# Patient Record
Sex: MALE | Race: WHITE
[De-identification: names, ages, dates, MRNs, and addresses within clinical notes are randomized per-mention and may not be internally consistent; named-entity substitution may affect disease eponyms.]

---

## 2017-04-05 ENCOUNTER — HOSPITAL ENCOUNTER (EMERGENCY)
Dept: HOSPITAL 74 - JERFT | Age: 32
Discharge: HOME | End: 2017-04-05
Payer: COMMERCIAL

## 2017-04-05 VITALS — DIASTOLIC BLOOD PRESSURE: 54 MMHG | TEMPERATURE: 98.3 F | HEART RATE: 64 BPM | SYSTOLIC BLOOD PRESSURE: 106 MMHG

## 2017-04-05 VITALS — BODY MASS INDEX: 27.4 KG/M2
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
## 2017-04-05 DIAGNOSIS — Z87.891: ICD-10-CM
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
## 2017-04-05 DIAGNOSIS — S93.401A: Primary | ICD-10-CM

## 2017-04-05 PROCEDURE — 2W3SX1Z IMMOBILIZATION OF RIGHT FOOT USING SPLINT: ICD-10-PCS

## 2017-04-05 NOTE — PDOC
History of Present Illness





- General


Chief Complaint: Pain


Stated Complaint: ANKLE PAIN


Time Seen by Provider: 04/05/17 11:28


History Source: Patient


Exam Limitations: No Limitations





- History of Present Illness


Initial Comments: 


04/05/17 12:36











Chief complaint: Twisted right ankle yesterday








History of present illness: Patient is a 32-year-old male with no significant 

medical problems here today complaining of pain to his right bilateral ankle 

with bruising of right posterior lateral foot and ankle area after he stepped 

on a box yesterday twisting his right ankle. Patient reports that pain 

currently as a 6 or 7 after taking Aleve earlier and an 8 with applying 

pressure to his right foot. Patient denies any numbness of right foot or ankle.





04/06/17 16:52








04/06/17 16:52








04/06/17 16:52





Occurred: reports: yesterday


Severity: Yes: moderate


Lower Extremity Pain Location: right: ankle


Method of Injury: Yes: twisted


Modifying Factors: improves with: immobilization





Lower Ext. Injury Location





- Specific Injury Location


Ankle: right ecchymosis, right pain, right swelling


Foot: right foot ecchymosis (lateral ), right foot pain





Extremity Pain Location





- Extremity Pain Location


Extremity Pain Locations: right: foot, ankle





Past History





- Past Medical History


Allergies/Adverse Reactions: 


 Allergies











Allergy/AdvReac Type Severity Reaction Status Date / Time


 


No Known Allergies Allergy   Verified 04/05/17 10:24











Home Medications: 


Ambulatory Orders





Ibuprofen 600 mg PO Q6H PRN #18 tablet 04/05/17 








Anemia: No


Asthma: No


Cancer: No


Cardiac Disorders: No


CVA: No


COPD: No


CHF: No


Dementia: No


Diabetes: No


GI Disorders: No


 Disorders: No


HTN: No


Hypercholesterolemia: No


Liver Disease: No


Suicide Attempt (Hx): No


Seizures: No


Thyroid Disease: No





- Surgical History


Abdominal Surgery: No


Appendectomy: No


Cardiac Surgery: No


Cholecystectomy: No


Lung Surgery: No


Neurologic Surgery: No


Orthopedic Surgery: No





- Psycho/Social/Smoking Cessation Hx


Suicidal Ideation: No


Smoking History: Never smoked


Have you smoked in the past 12 months: Yes


Number of Cigarettes Smoked Daily: 3


'Breaking Loose' booklet given: 08/19/15


Hx Alcohol Use: Yes (SOCIAL)


Drug/Substance Use Hx: No


Substance Use Type: None


Hx Substance Use Treatment: No





**Review of Systems





- Review of Systems


Able to Perform ROS?: Yes


Constitutional: No: Symptoms Reported


HEENTM: No: Symptoms Reported


Respiratory: No: Symptoms reported


Cardiac (ROS): No: Symptoms Reported


ABD/GI: No: Symptoms Reported


: No: Symptoms Reported


Musculoskeletal: Yes: Joint Pain (rt. ankle b/l), Joint Swelling (b/l ankle rt. 

)


Integumentary: Yes: Bruising (rt. lateral foot )


Neurological: No: Symptoms reported





*Physical Exam





- Vital Signs


 Last Vital Signs











Temp Pulse Resp BP Pulse Ox


 


 98.3 F   64   20   106/54   97 


 


 04/05/17 10:21  04/05/17 10:21  04/05/17 10:21  04/05/17 10:21  04/05/17 10:21














- Physical Exam


General Appearance: Yes: Appropriately Dressed


Vascular Pulses: Dorsalis-Pedis (R): 4+


Extremity: positive: Normal Capillary Refill, Normal Range of Motion (rt.ankle)

, Tender (rt. b/l ankle ), Swelling (rt. ankle b/l )


Integumentary: positive: Bruising (rt. foot bruising lateral )


Neurologic: positive: Normal Response, Respond to painful stimul (rt. foot/

ankle ), Responsive


Deep Tendon Reflexes: Ankle (L): 4+ (no induration )





Procedures





- Consent


Consent obtained: From Patient





- Splinting


Splint Location: Right: Foot, Ankle


Pre-Proc Neuro Vasc Exam: normal


Pre-Made Type: aircast


Ace Bandage: 3"


Complications: No





ED Treatment Course





- RADIOLOGY


Radiology Studies Ordered: 














 Category Date Time Status


 


 ANKLE & FOOT-RIGHT* [RAD] Stat Radiology  04/05/17 11:28 Completed














Medical Decision Making





- Medical Decision Making


04/05/17 12:37


 Patient is a 32-year-old male with no significant medical problems here today 

complaining of pain to his right bilateral ankle with bruising of right 

posterior lateral foot and ankle area after he stepped on a box yesterday 

twisting his right ankle. Patient reports that pain currently as a 6 or 7 after 

taking Aleve earlier and an 8 with applying pressure to his right foot. Patient 

denies any numbness of right foot or ankle.

















Right foot ankle pain rule out fracture versus sprain














Plan:








X-ray right foot ankle bimalleolar edema noted no fracture noted per Dr. Javier Norman wrap to right ankle and Aircast and crutches


Follow-up with orthopedist as soon as possible





Patient instructed to elevate his right foot as much as possible and apply ice 

for 15 minutes every hour





Ibuprofen 600 mg every 6 hours as needed for pain








04/05/17 12:38








04/06/17 16:55








*DC/Admit/Observation/Transfer


Diagnosis at time of Disposition: 


Right ankle sprain


Qualifiers:


 Encounter type: initial encounter Involved ligament of ankle: unspecified 

ligament Qualified Code(s): S93.401A - Sprain of unspecified ligament of right 

ankle, initial encounter





- Discharge Dispostion


Disposition: HOME


Condition at time of disposition: Stable





- Prescriptions


Prescriptions: 


Ibuprofen 600 mg PO Q6H PRN #18 tablet


 PRN Reason: Pain





- Referrals


Referrals: 


Liat Garg MD [Primary Care Provider] - 


Edward Pendleton MD [Staff Physician] - 





- Patient Instructions


Additional Instructions: 











Foot as much as possible and apply ice for 15 minutes every hour while awake 

today and tomorrow








Keep Ace wrap on an air cast during the day and use crutches for ambulation may 

take care cast and Ace wrap off at night when sleeping











Follow-up with orthopedist today or tomorrow











Return to emergency room if symptoms worsen














Patient voiced understanding of discharge instructions and all questions were 

answered





- Post Discharge Activity


Work/School Note:  Back to Work

## 2018-06-25 ENCOUNTER — HOSPITAL ENCOUNTER (EMERGENCY)
Dept: HOSPITAL 74 - JERFT | Age: 33
Discharge: HOME | End: 2018-06-25
Payer: COMMERCIAL

## 2018-06-25 VITALS — DIASTOLIC BLOOD PRESSURE: 60 MMHG | HEART RATE: 68 BPM | SYSTOLIC BLOOD PRESSURE: 113 MMHG | TEMPERATURE: 98 F

## 2018-06-25 VITALS — BODY MASS INDEX: 24.7 KG/M2

## 2018-06-25 DIAGNOSIS — S39.012A: Primary | ICD-10-CM
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
## 2018-06-25 NOTE — PDOC
History of Present Illness





- General


Chief Complaint: Back Pain


Stated Complaint: BACK PAIN


Time Seen by Provider: 06/25/18 09:24


History Source: Patient


Exam Limitations: No Limitations





- History of Present Illness


Initial Comments: 





06/25/18 09:36


Patient states yesterday was sitting into his car, fell backwards heavily and 

banged the back of his back on the car seat. Since that time as had pain to his 

inferior left rib border and back musculature. No medication for relief. Denies 

numbness or tingling to hands or feet, denies any neck or spine pain. States 

pain is primarily to the left flank lower back area


Occurred: reports: yesterday


Severity: reports: mild, moderate


Pain Location: reports: back


Method of Injury: Yes: fall


Modifying Factors: improves with: None


Loss of Consciousness: no loss of consciousness


Associated Symptoms (Fall): denies symptoms





Past History





- Travel


Traveled outside of the country in the last 30 days: No


Close contact w/someone who was outside of country & ill: No





- Past Medical History


Allergies/Adverse Reactions: 


 Allergies











Allergy/AdvReac Type Severity Reaction Status Date / Time


 


No Known Allergies Allergy   Verified 06/25/18 09:05











Home Medications: 


Ambulatory Orders





Cyclobenzaprine HCl 10 mg PO Q8H PRN #14 tablet 06/25/18 


Naproxen [Naprosyn -] 500 mg PO TID #30 tablet 06/25/18 








Anemia: No


Asthma: No


Cancer: No


Cardiac Disorders: No


CVA: No


COPD: No


CHF: No


Dementia: No


Diabetes: No


GI Disorders: No


 Disorders: No


HTN: No


Hypercholesterolemia: No


Liver Disease: No


Seizures: No


Thyroid Disease: No





- Surgical History


Abdominal Surgery: No


Appendectomy: No


Cardiac Surgery: No


Cholecystectomy: No


Lung Surgery: No


Neurologic Surgery: No


Orthopedic Surgery: No





- Suicide/Smoking/Psychosocial Hx


Smoking History: Never smoked


Have you smoked in the past 12 months: Yes


Number of Cigarettes Smoked Daily: 3


Information on smoking cessation initiated: No


'Breaking Loose' booklet given: 08/19/15


Hx Alcohol Use: Yes (SOCIAL)


Drug/Substance Use Hx: No


Substance Use Type: None


Hx Substance Use Treatment: No





**Review of Systems





- Review of Systems


Able to Perform ROS?: Yes


Is the patient limited English proficient: Yes


Constitutional: Yes: Symptoms Reported, See HPI, Malaise.  No: Chills, Fever


HEENTM: No: Symptoms Reported


Respiratory: No: Symptoms reported


Musculoskeletal: Yes: Symptoms Reported, See HPI, Back Pain, Joint Swelling, 

Muscle Pain


Integumentary: Yes: See HPI.  No: Symptoms Reported, Bruising


All Other Systems: Reviewed and Negative





*Physical Exam





- Vital Signs


 Last Vital Signs











Temp Pulse Resp BP Pulse Ox


 


 98.0 F   68   18   113/60   100 


 


 06/25/18 09:05  06/25/18 09:05  06/25/18 09:05  06/25/18 09:05  06/25/18 09:05














- Physical Exam


General Appearance: Yes: Nourished, Appropriately Dressed, Apparent Distress, 

Mild Distress


HEENT: positive: MIKI, Normal ENT Inspection, TMs Normal, Pharynx Normal


Neck: positive: Supple.  negative: Lymphadenopathy (R), Lymphadenopathy (L)


Respiratory/Chest: positive: Lungs Clear, Normal Breath Sounds (no true rib 

tenderness, crepitus or step-off, has no mechanism for rib fracture)


Cardiovascular: positive: Regular Rate


Gastrointestinal/Abdominal: positive: Tender, Soft


Musculoskeletal: positive: Normal Inspection, Muscle Spasm (tenderness along 

left paravertebral spinous muscles at waistline. No crepitus or step-offs to 

bone, no rashes lesions).  negative: Vertebral Tenderness


Extremity: positive: Normal Capillary Refill, Normal Inspection, Normal Range 

of Motion


Integumentary: positive: Normal Color, Dry, Pale.  negative: Rash


Neurologic: positive: CNs II-XII NML intact, Fully Oriented, Alert, Normal Mood/

Affect, Normal Response, Motor Strength 5/5





Progress Note





- Progress Note


Progress Note: 





Mild back strain With NSAIDs and cyclobenzaprine





*DC/Admit/Observation/Transfer


Diagnosis at time of Disposition: 


Low back strain


Qualifiers:


 Encounter type: initial encounter Qualified Code(s): S39.012A - Strain of 

muscle, fascia and tendon of lower back, initial encounter








- Discharge Dispostion


Disposition: HOME


Condition at time of disposition: Stable





- Prescriptions


Prescriptions: 


Cyclobenzaprine HCl 10 mg PO Q8H PRN #14 tablet


 PRN Reason: spasm


Naproxen [Naprosyn -] 500 mg PO TID #30 tablet





- Referrals


Referrals: 


Liat Garg MD [Primary Care Provider] - 





- Patient Instructions


Printed Discharge Instructions:  DI for Back Strain or Sprain


Additional Instructions: 


Rest, no heavy lifting or exercise until pain is resolved


Hot soaks to neck and low back as often as possible/hot showers or Jacuzzis


No massage or therapy until spasm is gone





Continue Naprosyn 500 mg tablet, 1 tablet every 8 hours for the next 3 days 

then as needed for pain and swelling


Cyclobenzaprine 1-10mg every 8 hours as needed for spasm





If not significant improvement within 24 hours with medication and rest regime, 

followup with private physician for change in medications and /or therapy.





- Post Discharge Activity


Forms/Work/School Notes:  Back to Work

## 2019-02-14 ENCOUNTER — HOSPITAL ENCOUNTER (EMERGENCY)
Dept: HOSPITAL 74 - JERFT | Age: 34
Discharge: HOME | End: 2019-02-14
Payer: COMMERCIAL

## 2019-02-14 VITALS — DIASTOLIC BLOOD PRESSURE: 54 MMHG | HEART RATE: 85 BPM | TEMPERATURE: 98.7 F | SYSTOLIC BLOOD PRESSURE: 119 MMHG

## 2019-02-14 VITALS — BODY MASS INDEX: 25.7 KG/M2
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
## 2019-02-14 DIAGNOSIS — S93.401A: Primary | ICD-10-CM
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
## 2019-02-14 PROCEDURE — 2W3QX1Z IMMOBILIZATION OF RIGHT LOWER LEG USING SPLINT: ICD-10-PCS | Performed by: NURSE PRACTITIONER

## 2019-02-14 NOTE — PDOC
History of Present Illness





- General


Chief Complaint: Injury


Stated Complaint: ANKLE INJURY


Time Seen by Provider: 02/14/19 20:53





- History of Present Illness


Initial Comments: 





02/14/19 21:30


CHIEF COMPLAINT: Ankle injury





HISTORY OF PRESENT ILLNESS: This is an otherwise healthy 33-year-old male who 

presents for evaluation of right ankle pain and swelling. Patient is a  

and twisted his ankle in a patch of ice today. Since then he has been able to 

bear only partial weight. He denies loss of sensation or any other symptoms. He 

did not sustain any other injuries.





REVIEW OF SYSTEMS:


GENERAL/CONSTITUTIONAL: No fever or chills. No weakness. No weight change.


MUSCULOSKELETAL: See HPI.


SKIN: No rash or easy bruising.


NEUROLOGIC: No headache, vertigo, loss of consciousness, or loss of sensation.


HEMATOLOGIC/LYMPHATIC: No anemia, easy bleeding, or history of blood clots.


ALLERGIC/IMMUNOLOGIC: No hives or skin allergy. No latex allergy.





PHYSICAL EXAM:


GENERAL: The patient is awake, alert, and fully oriented, in no acute distress.


EXTREMITIES: Right lateral malleolar tenderness and swelling. Movement and 

sensation of toes intact.


NEUROLOGICAL: Normal speech. CN II-XII grossly intact.


PSYCH: Normal mood, normal affect.


SKIN: Warm, dry, normal turgor, no rashes or lesions noted.





Past History





- Past Medical History


Allergies/Adverse Reactions: 


 Allergies











Allergy/AdvReac Type Severity Reaction Status Date / Time


 


No Known Allergies Allergy   Verified 02/14/19 20:55











Home Medications: 


Ambulatory Orders





Cyclobenzaprine HCl 10 mg PO Q8H PRN #14 tablet 06/25/18 


Naproxen [Naprosyn -] 500 mg PO TID #30 tablet 06/25/18 


Ibuprofen 600 mg PO Q6H #30 tablet 02/14/19 








Anemia: No


Asthma: No


Cancer: No


Cardiac Disorders: No


CVA: No


COPD: No


CHF: No


Dementia: No


Diabetes: No


GI Disorders: No


 Disorders: No


HTN: No


Hypercholesterolemia: No


Liver Disease: No


Seizures: No


Thyroid Disease: No





- Surgical History


Abdominal Surgery: No


Appendectomy: No


Cardiac Surgery: No


Cholecystectomy: No


Lung Surgery: No


Neurologic Surgery: No


Orthopedic Surgery: No





- Suicide/Smoking/Psychosocial Hx


Smoking History: Never smoked


Have you smoked in the past 12 months: Yes


Number of Cigarettes Smoked Daily: 3


'Breaking Loose' booklet given: 08/19/15


Hx Alcohol Use: Yes (SOCIAL)


Drug/Substance Use Hx: No


Substance Use Type: None


Hx Substance Use Treatment: No





Medical Decision Making





- Medical Decision Making





02/14/19 21:32


A/P: 33-year-old male with right ankle injury.





-Ankle/foot and tubes/fib x-rays preliminary read: Negative


-Ibuprofen for pain


-Jacinto dressing applied


-Crutches given


-RICE therapy reviewed


-Orthopedic referral





*DC/Admit/Observation/Transfer


Diagnosis at time of Disposition: 


Right ankle sprain


Qualifiers:


 Encounter type: initial encounter Involved ligament of ankle: unspecified 

ligament Qualified Code(s): S93.401A - Sprain of unspecified ligament of right 

ankle, initial encounter





Right knee pain


Qualifiers:


 Chronicity: acute Qualified Code(s): M25.561 - Pain in right knee








- Discharge Dispostion


Disposition: HOME


Condition at time of disposition: Stable


Decision to Admit order: No





- Prescriptions


Prescriptions: 


Ibuprofen 600 mg PO Q6H #30 tablet





- Referrals


Referrals: 


William Wiley DO [Staff Physician] - 14 days (Orthopedics)





- Patient Instructions


Printed Discharge Instructions:  DI for Ankle Sprain


Additional Instructions: 


-Rest with your foot elevated above the level of your heart


-Apply ice for 15 minutes at a time 5 times daily


-Wear the compression dressing given 2 and use crutches to minimize 

weightbearing


-Follow-up with orthopedics if symptoms not improved within 2 weeks


-Return to the emergency department for loss of sensation in your toes or any 

other concerning symptoms





- Post Discharge Activity


Forms/Work/School Notes:  Back to Work

## 2019-02-14 NOTE — PDOC
Rapid Medical Evaluation


Chief Complaint: Injury


Time Seen by Provider: 02/14/19 20:53


Medical Evaluation: 


 Allergies











Allergy/AdvReac Type Severity Reaction Status Date / Time


 


No Known Allergies Allergy   Verified 02/14/19 20:55








Vital Signs











Temp Pulse Resp BP Pulse Ox


 


 98.7 F   85   18   119/54 L  98 


 


 02/14/19 20:51  02/14/19 20:51  02/14/19 20:51  02/14/19 20:51  02/14/19 20:51








02/14/19 21:06


I have performed a brief in-person evaluation of this patient.





The patient presents with a chief complaint of: right ankle and leg pain s/p 

twisting right ankle on a curbside this afternoon.





Pertinent physical exam findings: moderate tenderness over dorsum and lateral 

aspect of right ankle





I have ordered the following: Rt ankle and tib-fib x-ray





The patient will proceed to the ED for further evaluation.





**Discharge Disposition





- Diagnosis


Right ankle sprain


Qualifiers:


 Encounter type: initial encounter Involved ligament of ankle: unspecified 

ligament Qualified Code(s): S93.401A - Sprain of unspecified ligament of right 

ankle, initial encounter





Right knee pain


Qualifiers:


 Chronicity: acute Qualified Code(s): M25.561 - Pain in right knee








- Discharge Dispostion


Condition at time of disposition: Stable





- Referrals





- Patient Instructions





- Post Discharge Activity